# Patient Record
Sex: FEMALE | Race: WHITE | URBAN - METROPOLITAN AREA
[De-identification: names, ages, dates, MRNs, and addresses within clinical notes are randomized per-mention and may not be internally consistent; named-entity substitution may affect disease eponyms.]

---

## 2019-02-12 ENCOUNTER — IMPORTED ENCOUNTER (OUTPATIENT)
Dept: URBAN - METROPOLITAN AREA CLINIC 50 | Facility: CLINIC | Age: 69
End: 2019-02-12

## 2019-02-28 ENCOUNTER — IMPORTED ENCOUNTER (OUTPATIENT)
Dept: URBAN - METROPOLITAN AREA CLINIC 50 | Facility: CLINIC | Age: 69
End: 2019-02-28

## 2019-03-27 ENCOUNTER — IMPORTED ENCOUNTER (OUTPATIENT)
Dept: URBAN - METROPOLITAN AREA CLINIC 50 | Facility: CLINIC | Age: 69
End: 2019-03-27

## 2019-04-04 ENCOUNTER — IMPORTED ENCOUNTER (OUTPATIENT)
Dept: URBAN - METROPOLITAN AREA CLINIC 50 | Facility: CLINIC | Age: 69
End: 2019-04-04

## 2019-04-11 ENCOUNTER — IMPORTED ENCOUNTER (OUTPATIENT)
Dept: URBAN - METROPOLITAN AREA CLINIC 50 | Facility: CLINIC | Age: 69
End: 2019-04-11

## 2019-04-11 NOTE — PATIENT DISCUSSION
"""S/P IOL OD: Sensar AAB00 21 +Omidria. Continue post operative instructions and drops per schedule.  """

## 2019-04-18 ENCOUNTER — IMPORTED ENCOUNTER (OUTPATIENT)
Dept: URBAN - METROPOLITAN AREA CLINIC 50 | Facility: CLINIC | Age: 69
End: 2019-04-18

## 2019-04-25 ENCOUNTER — IMPORTED ENCOUNTER (OUTPATIENT)
Dept: URBAN - METROPOLITAN AREA CLINIC 50 | Facility: CLINIC | Age: 69
End: 2019-04-25

## 2019-04-25 NOTE — PATIENT DISCUSSION
"""S/P IOL OS: Sensar AAB00 21.0 +Omidria. Continue post operative instructions and drops per schedule.  """

## 2019-05-02 ENCOUNTER — IMPORTED ENCOUNTER (OUTPATIENT)
Dept: URBAN - METROPOLITAN AREA CLINIC 50 | Facility: CLINIC | Age: 69
End: 2019-05-02

## 2019-05-23 ENCOUNTER — IMPORTED ENCOUNTER (OUTPATIENT)
Dept: URBAN - METROPOLITAN AREA CLINIC 50 | Facility: CLINIC | Age: 69
End: 2019-05-23

## 2021-05-15 ASSESSMENT — VISUAL ACUITY
OD_SC: 20/30+2
OS_BAT: 20/60
OD_BAT: 20/50
OS_SC: 20/50-1
OD_OTHER: 20/50. 20/80.
OD_OTHER: 20/50. 20/80.
OD_CC: J1
OS_PH: 20/40
OS_CC: 20/50+2
OS_BAT: 20/60
OS_BAT: 20/60
OS_CC: J1
OD_SC: 20/20-1
OS_OTHER: 20/60. 20/80.
OS_SC: 20/25-1
OD_SC: 20/200
OS_SC: 20/80
OD_SC: 20/80-
OD_BAT: 20/50
OD_SC: 20/20-
OD_SC: 20/20
OS_OTHER: 20/60. 20/60.
OS_SC: 20/20-1
OS_SC: 20/60
OD_PH: 20/40
OS_OTHER: 20/60. 20/60.

## 2021-05-15 ASSESSMENT — TONOMETRY
OD_IOP_MMHG: 20
OD_IOP_MMHG: 18
OD_IOP_MMHG: 18
OS_IOP_MMHG: 18
OD_IOP_MMHG: 20
OS_IOP_MMHG: 17
OS_IOP_MMHG: 18
OS_IOP_MMHG: 18
OS_IOP_MMHG: 19
OD_IOP_MMHG: 18
OD_IOP_MMHG: 16
OS_IOP_MMHG: 28

## 2023-07-21 ENCOUNTER — PREPPED CHART (OUTPATIENT)
Dept: URBAN - METROPOLITAN AREA CLINIC 50 | Facility: CLINIC | Age: 73
End: 2023-07-21

## 2023-07-24 ENCOUNTER — NEW PATIENT (OUTPATIENT)
Dept: URBAN - METROPOLITAN AREA CLINIC 52 | Facility: CLINIC | Age: 73
End: 2023-07-24

## 2023-07-24 DIAGNOSIS — H43.813: ICD-10-CM

## 2023-07-24 DIAGNOSIS — H02.831: ICD-10-CM

## 2023-07-24 DIAGNOSIS — H02.834: ICD-10-CM

## 2023-07-24 DIAGNOSIS — H26.493: ICD-10-CM

## 2023-07-24 DIAGNOSIS — H35.361: ICD-10-CM

## 2023-07-24 PROCEDURE — 92004 COMPRE OPH EXAM NEW PT 1/>: CPT

## 2023-07-24 ASSESSMENT — TONOMETRY
OD_IOP_MMHG: 16
OS_IOP_MMHG: 16

## 2023-07-24 ASSESSMENT — VISUAL ACUITY
OU_SC: 20/20-2
OS_SC: 20/25
OD_SC: 20/20

## 2023-09-11 ENCOUNTER — FOLLOW UP (OUTPATIENT)
Dept: URBAN - METROPOLITAN AREA CLINIC 52 | Facility: CLINIC | Age: 73
End: 2023-09-11

## 2023-09-11 DIAGNOSIS — H26.493: ICD-10-CM

## 2023-09-11 DIAGNOSIS — H02.834: ICD-10-CM

## 2023-09-11 DIAGNOSIS — H02.831: ICD-10-CM

## 2023-09-11 PROCEDURE — 99213 OFFICE O/P EST LOW 20 MIN: CPT

## 2023-09-11 ASSESSMENT — VISUAL ACUITY
OD_SC: 20/20-1
OD_GLARE: 20/20
OS_GLARE: 20/25
OS_SC: 20/30-1
OS_GLARE: 20/20-2
OD_GLARE: 20/20
OS_PH: 20/20-1

## 2023-09-11 ASSESSMENT — TONOMETRY
OS_IOP_MMHG: 16
OD_IOP_MMHG: 15

## 2023-09-15 ENCOUNTER — CONSULTATION/EVALUATION (OUTPATIENT)
Dept: URBAN - METROPOLITAN AREA CLINIC 52 | Facility: CLINIC | Age: 73
End: 2023-09-15

## 2023-09-15 DIAGNOSIS — H02.834: ICD-10-CM

## 2023-09-15 DIAGNOSIS — H02.831: ICD-10-CM

## 2023-09-15 DIAGNOSIS — H26.493: ICD-10-CM

## 2023-09-15 PROCEDURE — 99213 OFFICE O/P EST LOW 20 MIN: CPT

## 2023-09-15 PROCEDURE — 92285 EXTERNAL OCULAR PHOTOGRAPHY: CPT

## 2023-09-15 ASSESSMENT — VISUAL ACUITY
OS_SC: 20/25
OD_SC: 20/25-1

## 2023-09-15 ASSESSMENT — TONOMETRY
OD_IOP_MMHG: 16
OS_IOP_MMHG: 16

## 2023-09-29 ENCOUNTER — DIAGNOSTICS ONLY (OUTPATIENT)
Dept: URBAN - METROPOLITAN AREA CLINIC 52 | Facility: CLINIC | Age: 73
End: 2023-09-29

## 2023-09-29 DIAGNOSIS — H02.834: ICD-10-CM

## 2023-09-29 DIAGNOSIS — H02.831: ICD-10-CM

## 2023-09-29 PROCEDURE — 92285 EXTERNAL OCULAR PHOTOGRAPHY: CPT

## 2023-09-29 PROCEDURE — 92082 INTERMEDIATE VISUAL FIELD XM: CPT

## 2025-06-10 ENCOUNTER — COMPREHENSIVE EXAM (OUTPATIENT)
Age: 75
End: 2025-06-10

## 2025-06-10 DIAGNOSIS — H02.834: ICD-10-CM

## 2025-06-10 DIAGNOSIS — H26.493: ICD-10-CM

## 2025-06-10 DIAGNOSIS — H43.813: ICD-10-CM

## 2025-06-10 DIAGNOSIS — H02.831: ICD-10-CM

## 2025-06-10 DIAGNOSIS — H35.361: ICD-10-CM

## 2025-06-10 PROCEDURE — 99214 OFFICE O/P EST MOD 30 MIN: CPT
